# Patient Record
Sex: MALE | Race: BLACK OR AFRICAN AMERICAN | NOT HISPANIC OR LATINO | Employment: FULL TIME | URBAN - METROPOLITAN AREA
[De-identification: names, ages, dates, MRNs, and addresses within clinical notes are randomized per-mention and may not be internally consistent; named-entity substitution may affect disease eponyms.]

---

## 2020-10-20 ENCOUNTER — E-VISIT (OUTPATIENT)
Dept: FAMILY MEDICINE CLINIC | Facility: TELEHEALTH | Age: 25
End: 2020-10-20

## 2020-10-20 DIAGNOSIS — R06.02 SHORTNESS OF BREATH: ICD-10-CM

## 2020-10-20 DIAGNOSIS — R05.9 COUGH: Primary | ICD-10-CM

## 2020-10-20 DIAGNOSIS — R43.2 AGEUSIA: ICD-10-CM

## 2020-10-20 PROCEDURE — 99422 OL DIG E/M SVC 11-20 MIN: CPT | Performed by: NURSE PRACTITIONER

## 2020-10-20 RX ORDER — ALBUTEROL SULFATE 90 UG/1
2 AEROSOL, METERED RESPIRATORY (INHALATION) EVERY 4 HOURS PRN
Qty: 18 G | Refills: 0 | Status: CANCELLED | OUTPATIENT
Start: 2020-10-20

## 2020-10-20 RX ORDER — ALBUTEROL SULFATE 90 UG/1
2 AEROSOL, METERED RESPIRATORY (INHALATION) EVERY 4 HOURS PRN
Qty: 18 G | Refills: 0 | Status: SHIPPED | OUTPATIENT
Start: 2020-10-20

## 2020-10-20 NOTE — PROGRESS NOTES
"Juanjose Stoo    1995  [id number]    I have reviewed the e-Visit questionnaire and patient's answers, my assessment and plan are as follows:    Questionnaire:     --Symptoms:   --In the last 14 day, have you had contact with anyone who is ill, has shown any of the symptoms listed above and/or has been diagnosed with 2019 Novel Coronavirus? This includes any immediate household member but excludes any patients with whom you have been in contact within your normal work duties wearing proper PPE, if you are a healthcare worker:       HPI  Patient presents with persistent loss of taste, dry cough and night sweats that have been present since 8/27/2020 when he was first diagnosed with covid-19. He tested positive on 9/11 and 9/28 but finally had a negative test on 10/12/2020 despite still having symptoms. He has mild shortness of breath at times and his lungs feel like there is\" sand in them\". He has been off work for over a month and need family medical leave papers filled out but has no PCP. He wants to get re tested today or tomorrow to make sure his test wasn't false negative.       Review of Systems - General ROS: negative body aches, fever,  ROS positive for: fatigue and night sweats, loss of appetite and loss of taste  ENT ROS: negative for - headaches, nasal congestion, nasal discharge, sinus pain, sneezing or sore throat  Respiratory ROS: positive for - cough and occasional shortness of breath  Negative for: wheezing       Diagnoses and all orders for this visit:    1. Cough (Primary)  -     Ambulatory Referral to Internal Medicine    2. [place]    3. Shortness of breath  -     Ambulatory Referral to Internal Medicine    Other orders  -     Cancel: albuterol sulfate  (90 Base) MCG/ACT inhaler; Inhale 2 puffs Every 4 (Four) Hours As Needed for Wheezing.  Dispense: 18 g; Refill: 0  -     albuterol sulfate  (90 Base) MCG/ACT inhaler; Inhale 2 puffs Every 4 (Four) Hours As Needed for Wheezing.  " Dispense: 18 g; Refill: 0    Mucinex DM as directed every 12 hours prn cough.   Recommend going to the [facility] for evaluation due to mild shortness of breath and persistent covid 19 symptoms. Explained he could have a lung infection or pneumonia. Pt. Voices agreement and understanding of the need to per seen by a provider.   Increase decaffeinated fluids and rest.       Any medications prescribed have been sent electronically to   [id number] - [facility] [place] - [address] AT [address] [place] - [phone]  - [phone]   [address]  [place] [address]  Phone: [phone] Fax: [phone]      Time Documentation  Counseled patient  Counseling topics: treatment options, follow up plan and return instructions  Total encounter time: counseling time more than 50% of visit: 15 minutes        JANINA Cooper  10/20/20  17:45 EDT

## 2021-06-06 ENCOUNTER — APPOINTMENT (OUTPATIENT)
Dept: GENERAL RADIOLOGY | Facility: HOSPITAL | Age: 26
End: 2021-06-06

## 2021-06-06 ENCOUNTER — HOSPITAL ENCOUNTER (EMERGENCY)
Facility: HOSPITAL | Age: 26
Discharge: HOME | End: 2021-06-06
Admitting: EMERGENCY MEDICINE

## 2021-06-06 VITALS
BODY MASS INDEX: 20.69 KG/M2 | TEMPERATURE: 98.1 F | SYSTOLIC BLOOD PRESSURE: 124 MMHG | OXYGEN SATURATION: 96 % | DIASTOLIC BLOOD PRESSURE: 49 MMHG | HEIGHT: 72 IN | RESPIRATION RATE: 16 BRPM | HEART RATE: 82 BPM | WEIGHT: 152.78 LBS

## 2021-06-06 DIAGNOSIS — M79.641 BILATERAL HAND PAIN: Primary | ICD-10-CM

## 2021-06-06 DIAGNOSIS — M79.642 BILATERAL HAND PAIN: Primary | ICD-10-CM

## 2021-06-06 DIAGNOSIS — S62.392A CLOSED DISPLACED FRACTURE OF OTHER PART OF THIRD METACARPAL BONE OF RIGHT HAND, INITIAL ENCOUNTER: ICD-10-CM

## 2021-06-06 PROCEDURE — 99283 EMERGENCY DEPT VISIT LOW MDM: CPT

## 2021-06-06 PROCEDURE — 73130 X-RAY EXAM OF HAND: CPT

## 2021-06-06 RX ORDER — IBUPROFEN 400 MG/1
800 TABLET ORAL ONCE
Status: COMPLETED | OUTPATIENT
Start: 2021-06-06 | End: 2021-06-06

## 2021-06-06 RX ADMIN — IBUPROFEN 800 MG: 400 TABLET ORAL at 13:55

## 2021-06-06 NOTE — ED PROVIDER NOTES
Subjective   History: Patient is a 26-year-old male complains of bilateral hand pain since last night.  Reports he punched a door with bilateral fist.  Denies numbness tingling or weakness to extremities.  Pain is constant, throb, nothing makes it better or worse did not take any medication for pain.  History fracture right fourth metacarpal.      Onset: 1 day  Location: Bilateral hands  Duration: Constant  Character: Throb  Aggravating/Alleviating factors: None  Radiation nonradiating  Severity: Mild to moderate            Review of Systems   Constitutional: Negative for chills, fatigue and fever.   HENT: Negative for congestion, sore throat, tinnitus and trouble swallowing.    Eyes: Negative for photophobia, discharge and visual disturbance.   Respiratory: Negative for cough and shortness of breath.    Cardiovascular: Negative for chest pain.   Gastrointestinal: Negative for abdominal pain, diarrhea, nausea and vomiting.   Genitourinary: Negative for dysuria, frequency and urgency.   Musculoskeletal: Positive for myalgias. Negative for back pain.   Skin: Negative for rash.   Neurological: Negative for dizziness and headaches.   Psychiatric/Behavioral: Negative for confusion.       Past Medical History:   Diagnosis Date   • Asthma        No Known Allergies    Past Surgical History:   Procedure Laterality Date   • FRACTURE SURGERY      RT HAND   • WISDOM TOOTH EXTRACTION         History reviewed. No pertinent family history.    Social History     Socioeconomic History   • Marital status: Single     Spouse name: Not on file   • Number of children: Not on file   • Years of education: Not on file   • Highest education level: Not on file   Tobacco Use   • Smoking status: Never Smoker   Vaping Use   • Vaping Use: Every day   • Substances: Nicotine   Substance and Sexual Activity   • Alcohol use: Yes     Comment: occasional   • Drug use: Not Currently   • Sexual activity: Yes           Objective   Physical Exam  Vitals  reviewed.   Constitutional:       General: He is not in acute distress.     Appearance: He is normal weight. He is not toxic-appearing.   HENT:      Head: Normocephalic.   Cardiovascular:      Rate and Rhythm: Normal rate.      Pulses: Normal pulses.      Heart sounds: Normal heart sounds. No murmur heard.     Pulmonary:      Effort: Pulmonary effort is normal.   Musculoskeletal:         General: Swelling and tenderness present. No deformity.      Cervical back: Normal range of motion.      Comments: Right hand tenderness edema third and fourth metacarpal.  No erythema induration fluctuation.  Left hand tenderness third and fourth metacarpal without edema no erythema induration fluctuation.  Bilateral  mild strength distal neurovascular station intact less than 2-second cap refill.  No deformity.  2+ radial pulse bilateral.   Skin:     General: Skin is warm and dry.      Findings: No rash.   Neurological:      Mental Status: He is alert and oriented to person, place, and time.   Psychiatric:         Mood and Affect: Mood normal.         Behavior: Behavior normal.         Thought Content: Thought content normal.         Judgment: Judgment normal.         FX Dislocation    Date/Time: 6/6/2021 3:52 PM  Performed by: Marleny Waldrop APRN  Authorized by: Marleny Waldrop APRN     Consent:     Consent obtained:  Verbal    Consent given by:  Patient    Risks discussed:  Pain    Alternatives discussed:  No treatment  Injury:     Injury location:  Hand    Hand injury location:  R hand    Hand fracture type: third metacarpal    Pre-procedure assessment:     Neurological function: normal      Distal perfusion: normal      Range of motion: normal    Anesthesia (see MAR for exact dosages):     Anesthesia method:  None  Procedure details:     Manipulation performed: no      Skin traction used: no      Skeletal traction used: no      Pin inserted: no      Reduction successful: no      X-ray confirmed reduction: no       "Immobilization:  Splint    Splint type:  Volar short arm    Supplies used:  Elastic bandage, Ortho-Glass and cotton padding  Post-procedure details:     Neurological function: normal      Distal perfusion: normal      Range of motion: normal      Patient tolerance of procedure:  Tolerated well, no immediate complications               ED Course      /94 (BP Location: Left arm, Patient Position: Sitting)   Pulse 82   Temp 97.7 °F (36.5 °C) (Oral)   Resp 16   Ht 182.9 cm (72\")   Wt 69.3 kg (152 lb 12.5 oz)   SpO2 96%   BMI 20.72 kg/m²   Labs Reviewed - No data to display  Medications   ibuprofen (ADVIL,MOTRIN) tablet 800 mg (800 mg Oral Given 6/6/21 1355)     XR Hand 3+ View Bilateral    Result Date: 6/6/2021  1. There is an acute fracture of the right third metacarpal with angulation and mild displacement. 2. No left hand fracture or dislocation.  Electronically Signed By-Apryl Medina MD On:6/6/2021 2:09 PM This report was finalized on [id number] by  Apryl Medina MD.                                         MDM     I examined the patient using the appropriate personal protective equipment.      DISPOSITION:   Chart Review:  Comorbidity:  has a past medical history of Asthma.  Differentials:this list is not all inclusive and does not constitute the entirety of considered causes --> fracture dislocation contusion  ECG: interpreted by ER physician and reviewed by myself: Not applicable  Labs: Not applicable    Imaging: Was interpreted by physician and reviewed by myself:  XR Hand 3+ View Bilateral    Result Date: 6/6/2021  1. There is an acute fracture of the right third metacarpal with angulation and mild displacement. 2. No left hand fracture or dislocation.  Electronically Signed By-Apryl Medina MD On:6/6/2021 2:09 PM This report was finalized on [id number] by  Apryl Medina MD.      Disposition/Treatment:  Given ibuprofen for pain.  X-ray bilateral hands left x-ray negative for any fracture " dislocation.  Right handThere is an acute fracture of the right third metacarpal with  angulation and mild displacement.  Consult Dr. Berg per instant message okay with me applying volar splint and follow-up in the office.  Volar splint placed with distal neurovascular sensation intact postplacement.  Discussed care of splint discomfort of hand and use of ice.  Stated he has been to [facility] in the past for similar complaint before and may go back there.  I stated I will give him the physician that I spoke with name and number upon discharge and he can decide which place he would like to go.    Ortho: [facility]    prescription: None    Final diagnoses:   Bilateral hand pain   Closed displaced fracture of other part of third metacarpal bone of right hand, initial encounter       ED Disposition  ED Disposition     ED Disposition Condition Comment    Discharge Stable           No follow-up provider specified.       Medication List      No changes were made to your prescriptions during this visit.          Marleny Waldrop, APRN  06/06/21 1556

## 2021-06-06 NOTE — DISCHARGE INSTRUCTIONS
Leave splint in place do not get wet.  May use ice 20 minutes at a time every 3-4 hours.  Elevate extremity.  May take Tylenol ibuprofen for pain and follow-up with Dr. Curtis

## 2025-02-13 ENCOUNTER — OFFICE VISIT (OUTPATIENT)
Dept: FAMILY MEDICINE CLINIC | Facility: CLINIC | Age: 30
End: 2025-02-13
Payer: COMMERCIAL

## 2025-02-13 ENCOUNTER — TELEPHONE (OUTPATIENT)
Dept: FAMILY MEDICINE CLINIC | Facility: CLINIC | Age: 30
End: 2025-02-13

## 2025-02-13 VITALS
DIASTOLIC BLOOD PRESSURE: 80 MMHG | SYSTOLIC BLOOD PRESSURE: 128 MMHG | RESPIRATION RATE: 20 BRPM | OXYGEN SATURATION: 98 % | WEIGHT: 164 LBS | HEART RATE: 67 BPM | HEIGHT: 72 IN | BODY MASS INDEX: 22.21 KG/M2

## 2025-02-13 DIAGNOSIS — M25.562 ACUTE PAIN OF LEFT KNEE: ICD-10-CM

## 2025-02-13 DIAGNOSIS — G89.29 CHRONIC LEFT SHOULDER PAIN: ICD-10-CM

## 2025-02-13 DIAGNOSIS — M25.512 CHRONIC LEFT SHOULDER PAIN: ICD-10-CM

## 2025-02-13 DIAGNOSIS — F33.1 MODERATE EPISODE OF RECURRENT MAJOR DEPRESSIVE DISORDER: ICD-10-CM

## 2025-02-13 DIAGNOSIS — Z76.89 ENCOUNTER TO ESTABLISH CARE: Primary | ICD-10-CM

## 2025-02-13 PROCEDURE — 99204 OFFICE O/P NEW MOD 45 MIN: CPT | Performed by: PHYSICIAN ASSISTANT

## 2025-02-13 NOTE — TELEPHONE ENCOUNTER
Caller: Juanjose Soto    Relationship: Self    Best call back number: [phone]     What medication are you requesting: ZOLOFT    What are your current symptoms: DEPRESSION    How long have you been experiencing symptoms:     Have you had these symptoms before:    [] Yes  [] No    Have you been treated for these symptoms before:   [] Yes  [] No    If a prescription is needed, what is your preferred pharmacy and phone number: [id number] - [place], [place] - [address] [facility] [address] [facility] [phone] [facility] [phone]      Additional notes: PLEASE CALL AND ADVISE PATIENT.

## 2025-02-13 NOTE — PROGRESS NOTES
Chief Complaint  Chief Complaint   Patient presents with    Establish Care    Knee Pain     Bone spur    Pain     shoulder       Subjective        Juanjose Soto is a 29 y.o. male who presents to [facility].  History of Present Illness  Presents today to establish care and has concerns for left knee pain and left shoulder pain.    States he was told he had a bone spur in his left knee when he was a teenager.  Always had some lateral left knee discomfort, but over last year has had increase in left lateral and superior knee pain.  Sometimes left knee feels as if it will give out/buckle when he is walking, has some left knee weakness.   When he has his knee bent for long periods of time, he has a lot of pain at the top of his left knee with left knee extension.   Reports some pain with going up and down stairs, but it is not severe.   Leaving left knee extended and massaging superior/lateral aspect of left knee improves pain.   Denies numbness, tingling, left knee catching/locking/popping.     Left shoulder has been painful for a while now.  Reaching behind his back and raising his left arm causes left shoulder pain at posterior and anterior aspects.   Reports some left arm weakness due to left shoulder pain.   Reports some neck problems.  Massaging left shoulder gives him mild relief.   He attended physical therapy in the past for his left shoulder, was told he had left shoulder weakness.   Did home exercises, but this caused an increase in left shoulder pain, so he discontinued after 2 weeks.   Denies left shoulder injury, left arm numbness/tingling.     Based on PHQ-9, there are concerns for depression.  Reports feeling depressed mood and feeling hopeless most days, especially in the mornings for the last 3-4 years.  Cannot think of any causative event.   Feels as if he isn't doing enough and isn't sure of his purpose.   Sometimes has hard time falling asleep which can cause him to  "have a disrupted sleep schedule.   Sometimes watches TV late at night, if he can't fall asleep he will scroll on his phone.   Reports loss of interest in activities/hobbies.   Tries to take care of himself, but doesn't know if he does a great job of this.   Somewhat of a good social Crow, only visits friends on weekends due to time constraints of being in college full time.   Has had thoughts of SI and had a plan in the past, but not recently.  Denies HI or AVH or current thoughts of SI.  Alcohol intake- usually on weekends only, sometimes has about 10 drinks per night on the weekends, but then he will go a few weeks without any alcohol intake.   Denies illicit substance use.     Objective   /80 (BP Location: Left arm)   Pulse 67   Resp 20   Ht 182.9 cm (72.01\")   Wt 74.4 kg (164 lb)   SpO2 98%   BMI 22.24 kg/m²     Estimated body mass index is 22.24 kg/m² as calculated from the following:    Height as of this encounter: 182.9 cm (72.01\").    Weight as of this encounter: 74.4 kg (164 lb).     Physical Exam   GEN: In no acute distress, non toxic appearing  MSK: cervical neck not ttp, posterior aspect of left shoulder and superior to left scapular spine ttp.full left shoulder ROM intact, reports pain with left shoulder flexion.  Empty can test painful but negative, external rotation against resistance painful but negative, belly press test negative.   Superior/lateral aspect of left knee just superior to patella TTP.  Good ROM of bilateral knees, no crepitus upon knee extension. Reports left quadriceps tightness and superior left knee pain with left knee flexion. Valgus/varus test negative, anterior/posterior drawer test negative, Lexi test negative.   NEURO: AAO to person, place, and time. CN 2-12 intact grossly. Strength 5/5 in all 4 extremities. Sensation to light touch intact in all 4 extremities. Good how are you    PHQ-2 Depression Screening  Little interest or pleasure in doing things? Over " half   Feeling down, depressed, or hopeless? Over half   PHQ-2 Total Score 4         Result Review :              Assessment and Plan     Assessment & Plan  Encounter to establish care         Acute pain of left knee  Discussed left knee pain most likely due to left quadriceps tightness, encouraged quadriceps stretching, taking tylenol or ibuprofen and applying ice as needed.   Left knee xray has been ordered for further evaluation.   Orders:    XR Knee 1 or 2 View Left; Future    Chronic left shoulder pain  Discussed most likely due rotator cuff tendinitis, most likely supraspinatus and and infraspinatus as he had pain with empty can test and external rotation against resistance.  Discussed he would benefit from attending physical therapy for further evaluation management, he agrees to try this again.  Encouraged him to take Tylenol or ibuprofen as needed as well as applying ice as needed.  Orders:    Ambulatory Referral to Physical Therapy for Evaluation & Treatment    Moderate episode of recurrent major depressive disorder  Patient's depression is a recurrent episode that is moderate without psychosis. Depression is active and newly identified.  Had extensive discussion about potential options including medications, counseling, working on lifestyle modification, exercising, and good social Robinson.  He believes he will not be able to increase exercise due to left knee and left shoulder pain.  He is interested in trying a medication, therefore sertraline has been prescribed.  Counseling sheet has also been given.  Discussed the benefits of a good social Robinson and exercising.  Encouraged him to work good sleep hygiene, avoid watching screens before going to bed.  Orders:    sertraline (Zoloft) 50 MG tablet; Take 1 tablet by mouth Daily.    We will follow-up in about 2 months to ensure he is doing well with the sertraline and ensure he is having improvement in his left shoulder and left knee pain.  He is in  agreement with this plan and will call with any issues or concerns in the meantime.       Follow Up     Return in about 2 months (around 4/13/2025) for Recheck.

## 2025-03-02 NOTE — PROGRESS NOTES
"Chief complaint: Patient presents today for a physical          Patient is a 29 y.o. male who presents for his yearly physical exam.     HPI   Medical conditions include:  Depression-sertraline 50 mg    Has felt better since starting sertraline.    Concerns for right side of jaw \"clicking\".  States he has had this for years.  Sometimes clenches his teeth, but denies grinding his teeth.  Denies jaw being painful.    - Exercise: not regularly, trying to find time to exercise   - ETOH: rarely/socially, but would like to stop drinking completely  - Smoking: vapes nicotine product for about 8 years , would like to quit  - Diet: some fruits and vegetables, eats a lot of fast/processed foods, drinks mostly water, some soft drinks and coffee   -Sleep:    - Depression: just started sertraline 50mg    - Substance Use: none, history of smoking marijuana in high school     -No family history of colon or prostate cancer.                         /78 (BP Location: Left arm)   Pulse 57   Resp 18   Ht 182.9 cm (72.01\")   Wt 72.6 kg (160 lb)   SpO2 97%   BMI 21.70 kg/m²       GEN: In no acute distress, non toxic appearing  HEENT: Bilateral EACs clear, TMs of normal healthy appearance, middle ear spaces are clear. Mucous membranes moist. Oropharynx without erythema or exudate. No cervical or submandibular lymphadenopathy. Crepitus of right TMJ.   CV: Regular rate and rhythm, no murmurs, 2+ peripheral pulses, No extremity edema.   RESP: Lungs clear to auscultation anteriorly and posteriorly in all lung fields bilaterally.  MSK: No joint erythema, deformity, or effusion. Good ROM in upper and lower extremities.  NEURO: AAO to person, place, and time.   PSYCH: Affect normal, insight fair                  Assessment & Plan  Annual physical exam    Orders:    CBC Auto Differential; Future    Comprehensive Metabolic Panel; Future    Hemoglobin A1c; Future    Lipid Panel; Future    Need for hepatitis C screening test    Orders:   "  Hepatitis C Antibody; Future    Moderate episode of recurrent major depressive disorder  Continue sertraline 50 mg         TMJ syndrome  Encouraged him to apply heat, massage the area, and avoid eating hard foods.  Discussed if it becomes more of an issue for him, he can consider wearing a bite guard as he does notice that sometimes he slightly clenches his teeth.       He is in agreement with this plan and will follow-up at his next scheduled appointment.    HM:  Colorectal Screening:  Start at 45 unless risk factors   PSA(Over age 50):  N/A   Hep C: One time screening unless high risk     Screening Labs & Tests:  CBC, CMP, A1C, lipid panel  HEP C: ordered today     Immunization/Vaccinations  Influenza: Recommended annual influenza vaccine  Tetanus/Pertussis: Recommended to receive   Pneumovax: Not needed at this time  Shingles: Not needed at this time  COVID: Recommended      Lifestyle Counseling:  Lifestyle Modifications: Continue good lifestyle choices/modifications, Encouraged diet primarily of whole foods, decrease processed / packaged foods.  Reduce exposure to stress if possible.  Goal 150 minutes of moderate intensity exercise per week.  Decrease screen time.    Safety Issues: Always wear seatbelt, Avoid texting while driving.   Use sunscreen, regular skin examination  Recommended annual dental/vision examination.    Follow up: Return if symptoms worsen or fail to improve.  Plan of care discussed with pt. They verbalized understanding and agreement.

## 2025-03-03 ENCOUNTER — OFFICE VISIT (OUTPATIENT)
Dept: FAMILY MEDICINE CLINIC | Facility: CLINIC | Age: 30
End: 2025-03-03
Payer: COMMERCIAL

## 2025-03-03 VITALS
BODY MASS INDEX: 21.67 KG/M2 | OXYGEN SATURATION: 97 % | RESPIRATION RATE: 18 BRPM | HEIGHT: 72 IN | SYSTOLIC BLOOD PRESSURE: 118 MMHG | DIASTOLIC BLOOD PRESSURE: 78 MMHG | HEART RATE: 57 BPM | WEIGHT: 160 LBS

## 2025-03-03 DIAGNOSIS — F33.1 MODERATE EPISODE OF RECURRENT MAJOR DEPRESSIVE DISORDER: ICD-10-CM

## 2025-03-03 DIAGNOSIS — Z11.59 NEED FOR HEPATITIS C SCREENING TEST: ICD-10-CM

## 2025-03-03 DIAGNOSIS — Z00.00 ANNUAL PHYSICAL EXAM: Primary | ICD-10-CM

## 2025-03-03 DIAGNOSIS — M26.629 TMJ SYNDROME: ICD-10-CM

## 2025-03-03 PROCEDURE — 99395 PREV VISIT EST AGE 18-39: CPT | Performed by: PHYSICIAN ASSISTANT

## 2025-03-04 ENCOUNTER — LAB (OUTPATIENT)
Dept: FAMILY MEDICINE CLINIC | Facility: CLINIC | Age: 30
End: 2025-03-04
Payer: COMMERCIAL

## 2025-03-04 DIAGNOSIS — Z11.59 NEED FOR HEPATITIS C SCREENING TEST: ICD-10-CM

## 2025-03-04 DIAGNOSIS — Z00.00 ANNUAL PHYSICAL EXAM: ICD-10-CM

## 2025-03-04 LAB
ALBUMIN SERPL-MCNC: 4.4 G/DL (ref 3.5–5.2)
ALBUMIN/GLOB SERPL: 1.5 G/DL
ALP SERPL-CCNC: 112 U/L (ref 39–117)
ALT SERPL W P-5'-P-CCNC: 14 U/L (ref 1–41)
ANION GAP SERPL CALCULATED.3IONS-SCNC: 7.2 MMOL/L (ref 5–15)
AST SERPL-CCNC: 17 U/L (ref 1–40)
BASOPHILS # BLD AUTO: 0.08 10*3/MM3 (ref 0–0.2)
BASOPHILS NFR BLD AUTO: 1.3 % (ref 0–1.5)
BILIRUB SERPL-MCNC: 0.6 MG/DL (ref 0–1.2)
BUN SERPL-MCNC: 14 MG/DL (ref 6–20)
BUN/CREAT SERPL: 14.3 (ref 7–25)
CALCIUM SPEC-SCNC: 9.5 MG/DL (ref 8.6–10.5)
CHLORIDE SERPL-SCNC: 103 MMOL/L (ref 98–107)
CHOLEST SERPL-MCNC: 138 MG/DL (ref 0–200)
CO2 SERPL-SCNC: 27.8 MMOL/L (ref 22–29)
CREAT SERPL-MCNC: 0.98 MG/DL (ref 0.76–1.27)
DEPRECATED RDW RBC AUTO: 40.9 FL (ref 37–54)
EGFRCR SERPLBLD CKD-EPI 2021: 108.4 ML/MIN/1.73
EOSINOPHIL # BLD AUTO: 0.15 10*3/MM3 (ref 0–0.4)
EOSINOPHIL NFR BLD AUTO: 2.5 % (ref 0.3–6.2)
ERYTHROCYTE [DISTWIDTH] IN BLOOD BY AUTOMATED COUNT: 12.5 % (ref 12.3–15.4)
GLOBULIN UR ELPH-MCNC: 2.9 GM/DL
GLUCOSE SERPL-MCNC: 92 MG/DL (ref 65–99)
HBA1C MFR BLD: 5.4 % (ref 4.8–5.6)
HCT VFR BLD AUTO: 47.1 % (ref 37.5–51)
HCV AB SER QL: NORMAL
HDLC SERPL-MCNC: 56 MG/DL (ref 40–60)
HGB BLD-MCNC: 16.1 G/DL (ref 13–17.7)
IMM GRANULOCYTES # BLD AUTO: 0.03 10*3/MM3 (ref 0–0.05)
IMM GRANULOCYTES NFR BLD AUTO: 0.5 % (ref 0–0.5)
LDLC SERPL CALC-MCNC: 72 MG/DL (ref 0–100)
LDLC/HDLC SERPL: 1.31 {RATIO}
LYMPHOCYTES # BLD AUTO: 2.89 10*3/MM3 (ref 0.7–3.1)
LYMPHOCYTES NFR BLD AUTO: 47.5 % (ref 19.6–45.3)
MCH RBC QN AUTO: 30.6 PG (ref 26.6–33)
MCHC RBC AUTO-ENTMCNC: 34.2 G/DL (ref 31.5–35.7)
MCV RBC AUTO: 89.4 FL (ref 79–97)
MONOCYTES # BLD AUTO: 0.58 10*3/MM3 (ref 0.1–0.9)
MONOCYTES NFR BLD AUTO: 9.5 % (ref 5–12)
NEUTROPHILS NFR BLD AUTO: 2.36 10*3/MM3 (ref 1.7–7)
NEUTROPHILS NFR BLD AUTO: 38.7 % (ref 42.7–76)
NRBC BLD AUTO-RTO: 0 /100 WBC (ref 0–0.2)
PLATELET # BLD AUTO: 257 10*3/MM3 (ref 140–450)
PMV BLD AUTO: 10.9 FL (ref 6–12)
POTASSIUM SERPL-SCNC: 4 MMOL/L (ref 3.5–5.2)
PROT SERPL-MCNC: 7.3 G/DL (ref 6–8.5)
RBC # BLD AUTO: 5.27 10*6/MM3 (ref 4.14–5.8)
SODIUM SERPL-SCNC: 138 MMOL/L (ref 136–145)
TRIGL SERPL-MCNC: 44 MG/DL (ref 0–150)
VLDLC SERPL-MCNC: 10 MG/DL (ref 5–40)
WBC NRBC COR # BLD AUTO: 6.09 10*3/MM3 (ref 3.4–10.8)

## 2025-03-04 PROCEDURE — 80053 COMPREHEN METABOLIC PANEL: CPT | Performed by: PHYSICIAN ASSISTANT

## 2025-03-04 PROCEDURE — 80061 LIPID PANEL: CPT | Performed by: PHYSICIAN ASSISTANT

## 2025-03-04 PROCEDURE — 86803 HEPATITIS C AB TEST: CPT | Performed by: PHYSICIAN ASSISTANT

## 2025-03-04 PROCEDURE — 85025 COMPLETE CBC W/AUTO DIFF WBC: CPT | Performed by: PHYSICIAN ASSISTANT

## 2025-03-04 PROCEDURE — 36415 COLL VENOUS BLD VENIPUNCTURE: CPT

## 2025-03-04 PROCEDURE — 83036 HEMOGLOBIN GLYCOSYLATED A1C: CPT | Performed by: PHYSICIAN ASSISTANT

## 2025-04-07 ENCOUNTER — OFFICE VISIT (OUTPATIENT)
Dept: FAMILY MEDICINE CLINIC | Facility: CLINIC | Age: 30
End: 2025-04-07
Payer: COMMERCIAL

## 2025-04-07 VITALS
HEIGHT: 72 IN | HEART RATE: 74 BPM | SYSTOLIC BLOOD PRESSURE: 118 MMHG | WEIGHT: 162 LBS | OXYGEN SATURATION: 98 % | RESPIRATION RATE: 20 BRPM | DIASTOLIC BLOOD PRESSURE: 78 MMHG | BODY MASS INDEX: 21.94 KG/M2

## 2025-04-07 DIAGNOSIS — M25.562 ACUTE PAIN OF LEFT KNEE: Primary | ICD-10-CM

## 2025-04-07 DIAGNOSIS — F33.1 MODERATE EPISODE OF RECURRENT MAJOR DEPRESSIVE DISORDER: ICD-10-CM

## 2025-04-07 DIAGNOSIS — M25.512 CHRONIC LEFT SHOULDER PAIN: ICD-10-CM

## 2025-04-07 DIAGNOSIS — G89.29 CHRONIC LEFT SHOULDER PAIN: ICD-10-CM

## 2025-04-07 PROCEDURE — 99213 OFFICE O/P EST LOW 20 MIN: CPT | Performed by: PHYSICIAN ASSISTANT

## 2025-04-07 NOTE — PROGRESS NOTES
"Chief Complaint  Chief Complaint   Patient presents with    Pain     Knee-follow up        Subjective        Juanjose Soto is a 29 y.o. male who presents to [facility].  History of Present Illness  Presents today for 2-month follow-up on left knee pain.    States he has not had improvement with left knee pain.  Left knee x-ray from 2/2025 shows no acute bony injury to the left knee and bony exostosis along distal lateral margin of the femur.  Has lateral knee pain after excess movement.  After exercise/excess movement, he has superior left knee pain and swelling behind his knee.   Has not been stretching his quadriceps, applying ice, or taking tylenol/ibuprofen regularly.     States left shoulder pain has improved with doing at home strengthening exercises.  Once he has improvement in his left shoulder though, he discontinues strengthening exercises and left shoulder pain returns.     Doing well with sertraline 50mg.  Only issues he is having is side effects from not taking consistently.  Believes sertraline helps and makes him feel more like his \"normal self\".     Objective   /78 (BP Location: Left arm)   Pulse 74   Resp 20   Ht 182.9 cm (72.01\")   Wt 73.5 kg (162 lb)   SpO2 98%   BMI 21.97 kg/m²     Estimated body mass index is 21.97 kg/m² as calculated from the following:    Height as of this encounter: 182.9 cm (72.01\").    Weight as of this encounter: 73.5 kg (162 lb).     Physical Exam   GEN: In no acute distress, non toxic appearing  CV: Regular rate and rhythm, no murmurs, 2+ peripheral pulses, No extremity edema.   RESP: Lungs clear to auscultation anteriorly and posteriorly in all lung fields bilaterally.  MSK: superior/lateral aspect of left patella ttp, reports pain of left knee just superior to left patella with left knee extension. No crepitus with left knee extension/flexion. No joint erythema, deformity, or effusion. Good ROM in upper and lower " extremities.  PSYCH: Affect normal, insight fair       Result Review :              Assessment and Plan     Assessment & Plan  Acute pain of left knee  Strongly encouraged him to stretch quadriceps, apply ice, and take Tylenol or ibuprofen as needed.  Discussed he may have benefit from attending physical therapy, referral has been made.  Orders:    Ambulatory Referral to Physical Therapy for Evaluation & Treatment    Moderate episode of recurrent major depressive disorder  Continue sertraline 50mg.       Chronic left shoulder pain  As he has improvement with at home strengthening exercises, encouraged him to continue these daily and consistently.  If he feels as if he needs it, referral to physical therapy can also be made for his left shoulder pain.       Follow-up in about 6 months to ensure he is doing well with continuing sertraline.  He is in agreement with this plan and will call should his symptoms worsen or not improved.       Follow Up     Return in about 6 months (around 10/7/2025) for Recheck.

## 2025-04-08 DIAGNOSIS — M25.562 ACUTE PAIN OF LEFT KNEE: ICD-10-CM

## 2025-05-18 DIAGNOSIS — F33.1 MODERATE EPISODE OF RECURRENT MAJOR DEPRESSIVE DISORDER: ICD-10-CM

## 2025-07-08 ENCOUNTER — TELEPHONE (OUTPATIENT)
Dept: FAMILY MEDICINE CLINIC | Facility: CLINIC | Age: 30
End: 2025-07-08
Payer: MEDICAID

## 2025-07-08 NOTE — TELEPHONE ENCOUNTER
Needs a new physical therapy order faxed to [facility] at fax [phone] for therapy on his left knee. The order they have  on 2025.

## 2025-07-10 DIAGNOSIS — M25.562 ACUTE PAIN OF LEFT KNEE: Primary | ICD-10-CM

## 2025-08-17 ENCOUNTER — HOSPITAL ENCOUNTER (OUTPATIENT)
Facility: HOSPITAL | Age: 30
Discharge: HOME | End: 2025-08-17
Attending: EMERGENCY MEDICINE | Admitting: EMERGENCY MEDICINE
Payer: MEDICAID

## 2025-08-17 ENCOUNTER — APPOINTMENT (OUTPATIENT)
Dept: GENERAL RADIOLOGY | Facility: HOSPITAL | Age: 30
End: 2025-08-17
Payer: MEDICAID

## 2025-08-17 VITALS
HEIGHT: 72 IN | TEMPERATURE: 98.5 F | DIASTOLIC BLOOD PRESSURE: 80 MMHG | OXYGEN SATURATION: 98 % | WEIGHT: 158.2 LBS | RESPIRATION RATE: 16 BRPM | SYSTOLIC BLOOD PRESSURE: 118 MMHG | BODY MASS INDEX: 21.43 KG/M2 | HEART RATE: 64 BPM

## 2025-08-17 DIAGNOSIS — M25.511 ACUTE PAIN OF RIGHT SHOULDER: Primary | ICD-10-CM

## 2025-08-17 PROCEDURE — 73030 X-RAY EXAM OF SHOULDER: CPT

## 2025-08-17 PROCEDURE — G0463 HOSPITAL OUTPT CLINIC VISIT: HCPCS | Performed by: NURSE PRACTITIONER

## 2025-08-17 RX ORDER — CYCLOBENZAPRINE HCL 10 MG
10 TABLET ORAL 3 TIMES DAILY PRN
Qty: 15 TABLET | Refills: 0 | Status: SHIPPED | OUTPATIENT
Start: 2025-08-17 | End: 2025-08-22

## 2025-08-17 RX ORDER — CYCLOBENZAPRINE HCL 10 MG
10 TABLET ORAL ONCE
Status: COMPLETED | OUTPATIENT
Start: 2025-08-17 | End: 2025-08-17

## 2025-08-17 RX ORDER — IBUPROFEN 600 MG/1
600 TABLET, FILM COATED ORAL ONCE
Status: COMPLETED | OUTPATIENT
Start: 2025-08-17 | End: 2025-08-17

## 2025-08-17 RX ADMIN — CYCLOBENZAPRINE HYDROCHLORIDE 10 MG: 10 TABLET, FILM COATED ORAL at 13:54

## 2025-08-17 RX ADMIN — IBUPROFEN 600 MG: 600 TABLET ORAL at 13:54

## 2025-08-18 ENCOUNTER — E-VISIT (OUTPATIENT)
Dept: ADMINISTRATIVE | Facility: OTHER | Age: 30
End: 2025-08-18
Payer: MEDICAID

## 2025-08-29 DIAGNOSIS — F33.1 MODERATE EPISODE OF RECURRENT MAJOR DEPRESSIVE DISORDER: ICD-10-CM
